# Patient Record
Sex: FEMALE | Race: WHITE | NOT HISPANIC OR LATINO | RURAL
[De-identification: names, ages, dates, MRNs, and addresses within clinical notes are randomized per-mention and may not be internally consistent; named-entity substitution may affect disease eponyms.]

---

## 2021-01-22 ENCOUNTER — HISTORICAL (OUTPATIENT)
Dept: ADMINISTRATIVE | Facility: HOSPITAL | Age: 36
End: 2021-01-22

## 2021-01-22 LAB — SARS-COV+SARS-COV-2 AG RESP QL IA.RAPID: NEGATIVE

## 2022-09-19 ENCOUNTER — HOSPITAL ENCOUNTER (EMERGENCY)
Facility: HOSPITAL | Age: 37
Discharge: HOME | End: 2022-09-19
Payer: COMMERCIAL

## 2022-09-19 VITALS
HEIGHT: 63 IN | TEMPERATURE: 99 F | WEIGHT: 140 LBS | HEART RATE: 82 BPM | BODY MASS INDEX: 24.8 KG/M2 | DIASTOLIC BLOOD PRESSURE: 74 MMHG | RESPIRATION RATE: 18 BRPM | OXYGEN SATURATION: 98 % | SYSTOLIC BLOOD PRESSURE: 116 MMHG

## 2022-09-19 DIAGNOSIS — S93.602A FOOT SPRAIN, LEFT, INITIAL ENCOUNTER: Primary | ICD-10-CM

## 2022-09-19 DIAGNOSIS — S99.922A INJURY OF LEFT FOOT: ICD-10-CM

## 2022-09-19 PROCEDURE — 99284 EMERGENCY DEPT VISIT MOD MDM: CPT

## 2022-09-19 PROCEDURE — 96372 THER/PROPH/DIAG INJ SC/IM: CPT

## 2022-09-19 PROCEDURE — 63600175 PHARM REV CODE 636 W HCPCS: Performed by: NURSE PRACTITIONER

## 2022-09-19 PROCEDURE — 99284 EMERGENCY DEPT VISIT MOD MDM: CPT | Mod: ,,, | Performed by: NURSE PRACTITIONER

## 2022-09-19 PROCEDURE — 99284 PR EMERGENCY DEPT VISIT,LEVEL IV: ICD-10-PCS | Mod: ,,, | Performed by: NURSE PRACTITIONER

## 2022-09-19 RX ORDER — KETOROLAC TROMETHAMINE 30 MG/ML
30 INJECTION, SOLUTION INTRAMUSCULAR; INTRAVENOUS
Status: COMPLETED | OUTPATIENT
Start: 2022-09-19 | End: 2022-09-19

## 2022-09-19 RX ADMIN — KETOROLAC TROMETHAMINE 30 MG: 30 INJECTION, SOLUTION INTRAMUSCULAR at 01:09

## 2022-09-19 NOTE — DISCHARGE INSTRUCTIONS
Wear ace wrap for support. Apply ice pack to area of pain 4-6 times per day. Take Tylenol or ibuprofen as needed for pain. Use crutches for at least 3 days to keep weight off of left foot. If still having pain after 3-5 days, follow-up with your PCP for recheck.

## 2022-09-19 NOTE — Clinical Note
"Tom Durbinie" Jovana was seen and treated in our emergency department on 9/19/2022.  She may return to work on 09/22/2022.       If you have any questions or concerns, please don't hesitate to call.      More Del Rosario NP"

## 2022-09-19 NOTE — ED PROVIDER NOTES
Encounter Date: 9/19/2022       History     Chief Complaint   Patient presents with    Foot Injury     Fell while running after hearing a pop to left foot     Presented with c/o pain in left foot after turning in over while running tonight around 1900.  fell to the ground due to the pain.  pain continues and has worsened especially with weight bearing.  has not taken any OTC pain med because she did not want to get up and get it. Has not applied ice pack since injury.  has had ankle fracture on left in the past but denies ankle pain now.    Review of patient's allergies indicates:   Allergen Reactions    Ceclor [cefaclor] Hives     Past Medical History:   Diagnosis Date    Asthma      Past Surgical History:   Procedure Laterality Date    TONSILLECTOMY      TUBAL LIGATION      TYMPANOSTOMY TUBE PLACEMENT       History reviewed. No pertinent family history.  Social History     Tobacco Use    Smoking status: Former     Types: Cigarettes, Vaping with nicotine    Smokeless tobacco: Never    Tobacco comments:     Vape with nicotene   Substance Use Topics    Alcohol use: Not Currently    Drug use: Not Currently     Review of Systems   Musculoskeletal:  Positive for arthralgias (left foot pain).   All other systems reviewed and are negative.    Physical Exam     Initial Vitals [09/19/22 0114]   BP Pulse Resp Temp SpO2   (!) 131/91 100 20 98.5 °F (36.9 °C) 100 %      MAP       --         Physical Exam    Vitals reviewed.  Constitutional: She appears well-developed and well-nourished. No distress.   HENT:   Head: Normocephalic.   Right Ear: External ear normal.   Left Ear: External ear normal.   Nose: Nose normal.   Mouth/Throat: Oropharynx is clear and moist.   Eyes: EOM are normal.   Neck: Neck supple.   Normal range of motion.  Cardiovascular:  Normal rate, regular rhythm, normal heart sounds and intact distal pulses.           Pulmonary/Chest: Breath sounds normal.   Abdominal: Abdomen is soft.  Bowel sounds are normal.   Musculoskeletal:         General: Tenderness (localized left medial foot) and edema (left foot dorsal aspect) present. Normal range of motion.      Cervical back: Normal range of motion and neck supple.     Neurological: She is alert and oriented to person, place, and time. She has normal strength. No cranial nerve deficit. GCS score is 15. GCS eye subscore is 4. GCS verbal subscore is 5. GCS motor subscore is 6.   Skin: Skin is warm and dry. Capillary refill takes less than 2 seconds.   Psychiatric: She has a normal mood and affect. Her behavior is normal. Judgment and thought content normal.       Medical Screening Exam   See Full Note    ED Course   Procedures  Labs Reviewed - No data to display       Imaging Results              X-Ray Foot Complete Left (In process)                   X-Rays:   Independently Interpreted Readings:   Other Readings:  Left foot: No acute fracture.  Medications   ketorolac injection 30 mg (30 mg Intramuscular Given 9/19/22 0138)     Medical Decision Making:   ED Management:  Xray left foot negative. Toradol IM given. Ace wrap applied. Crutches provided for patient and instructed on use per ED nurse. Instructed on RICE, no wt bearing for 3 days and follow-up for recheck if pain persists.                 Clinical Impression:   Final diagnoses:  [S99.922A] Injury of left foot  [S93.602A] Foot sprain, left, initial encounter (Primary)        ED Disposition Condition    Discharge Stable          ED Prescriptions    None       Follow-up Information       Follow up With Specialties Details Why Contact Info    [facility]  If needed for persistent pain [address] [facility]  [facility] [place] [address]  [phone]               More Del Rosario NP  09/19/22 0325       More Del Rosario NP  09/19/22 032

## 2022-09-19 NOTE — ED TRIAGE NOTES
Patient was running on a track at 1900 when she heard a pop to her left foot and fell, now has some swelling and pain, has not taken anything for the pain, has propped it up

## 2022-09-21 ENCOUNTER — OFFICE VISIT (OUTPATIENT)
Dept: FAMILY MEDICINE | Facility: CLINIC | Age: 37
End: 2022-09-21
Payer: COMMERCIAL

## 2022-09-21 VITALS
DIASTOLIC BLOOD PRESSURE: 78 MMHG | HEART RATE: 93 BPM | SYSTOLIC BLOOD PRESSURE: 114 MMHG | WEIGHT: 140 LBS | BODY MASS INDEX: 24.8 KG/M2 | HEIGHT: 63 IN

## 2022-09-21 DIAGNOSIS — M79.672 LEFT FOOT PAIN: ICD-10-CM

## 2022-09-21 DIAGNOSIS — M79.672 PAIN IN LEFT FOOT: Primary | ICD-10-CM

## 2022-09-21 PROCEDURE — 1159F PR MEDICATION LIST DOCUMENTED IN MEDICAL RECORD: ICD-10-PCS | Mod: CPTII,,, | Performed by: FAMILY MEDICINE

## 2022-09-21 PROCEDURE — 99203 PR OFFICE/OUTPT VISIT, NEW, LEVL III, 30-44 MIN: ICD-10-PCS | Mod: 25,,, | Performed by: FAMILY MEDICINE

## 2022-09-21 PROCEDURE — 3078F PR MOST RECENT DIASTOLIC BLOOD PRESSURE < 80 MM HG: ICD-10-PCS | Mod: CPTII,,, | Performed by: FAMILY MEDICINE

## 2022-09-21 PROCEDURE — 3008F BODY MASS INDEX DOCD: CPT | Mod: CPTII,,, | Performed by: FAMILY MEDICINE

## 2022-09-21 PROCEDURE — 96372 THER/PROPH/DIAG INJ SC/IM: CPT | Mod: ,,, | Performed by: FAMILY MEDICINE

## 2022-09-21 PROCEDURE — 1159F MED LIST DOCD IN RCRD: CPT | Mod: CPTII,,, | Performed by: FAMILY MEDICINE

## 2022-09-21 PROCEDURE — 3074F SYST BP LT 130 MM HG: CPT | Mod: CPTII,,, | Performed by: FAMILY MEDICINE

## 2022-09-21 PROCEDURE — 99203 OFFICE O/P NEW LOW 30 MIN: CPT | Mod: 25,,, | Performed by: FAMILY MEDICINE

## 2022-09-21 PROCEDURE — 3008F PR BODY MASS INDEX (BMI) DOCUMENTED: ICD-10-PCS | Mod: CPTII,,, | Performed by: FAMILY MEDICINE

## 2022-09-21 PROCEDURE — 3074F PR MOST RECENT SYSTOLIC BLOOD PRESSURE < 130 MM HG: ICD-10-PCS | Mod: CPTII,,, | Performed by: FAMILY MEDICINE

## 2022-09-21 PROCEDURE — 3078F DIAST BP <80 MM HG: CPT | Mod: CPTII,,, | Performed by: FAMILY MEDICINE

## 2022-09-21 PROCEDURE — 96372 PR INJECTION,THERAP/PROPH/DIAG2ST, IM OR SUBCUT: ICD-10-PCS | Mod: ,,, | Performed by: FAMILY MEDICINE

## 2022-09-21 RX ORDER — KETOROLAC TROMETHAMINE 10 MG/1
10 TABLET, FILM COATED ORAL EVERY 6 HOURS
Qty: 20 TABLET | Refills: 0 | Status: SHIPPED | OUTPATIENT
Start: 2022-09-21 | End: 2022-09-26

## 2022-09-21 RX ORDER — KETOROLAC TROMETHAMINE 30 MG/ML
60 INJECTION, SOLUTION INTRAMUSCULAR; INTRAVENOUS
Status: COMPLETED | OUTPATIENT
Start: 2022-09-21 | End: 2022-09-21

## 2022-09-21 RX ADMIN — KETOROLAC TROMETHAMINE 60 MG: 30 INJECTION, SOLUTION INTRAMUSCULAR; INTRAVENOUS at 02:09

## 2022-09-21 NOTE — LETTER
September 21, 2022      [facility]  [address] [place] [address]  Phone: [phone]  Fax: [phone]       Patient: Tom Whaley   YOB: 1985  Date of Visit: 09/21/2022    To Whom It May Concern:    Nancy Whaley  was at [facility] on 09/21/2022. The patient may return to work/school on 09/26/2022 with no restrictions. If you have any questions or concerns, or if I can be of further assistance, please do not hesitate to contact me.    Sincerely,    Mary Kay Segura RN

## 2022-09-21 NOTE — LETTER
September 26, 2022      [facility]  [address] [place] [address]  Phone: [phone]  Fax: [phone]       Patient: Tom Whaley   YOB: 1985  Date of Visit: 09/26/2022    To Whom It May Concern:    Nancy Whaley  was at [facility] on 09/21/2022. The patient may return to work/school on 10/03/2022 with no restrictions. If you have any questions or concerns, or if I can be of further assistance, please do not hesitate to contact me.    Sincerely,    Vinicio Sr, DO

## 2022-09-21 NOTE — PROGRESS NOTES
Vinicio Sr IV, DO  [facility]  [address] [place] [address]  Phone: [phone]      Subjective     Name: Tom Whaley   Sex: female  YOB: 1985 (37 y.o.)  MRN: [id number]  Visit Date: 09/21/2022   Chief Complaint: Foot Pain (Injured foot suday evening)        HISTORY OF PRESENT ILLNESS:    Patient presents clinic for follow-up.  She rolled her foot this past weekend at the track.  And attended the emergency department afterwards.  They did an x-ray which showed no acute fractures or other pathological processes.  Today in the clinic, there is notable bruising between the 2nd 3rd and 4th toes.  There is a slight erythema on the dorsum of the foot as well as acute tenderness to palpation in the area of the extensor digitorum brevis.  Patient is unable to extend her 1st 2nd or 3rd digits and is easy to flex them with moderate pain.  My concern is partial tear of the extensor digitorum brevis and I suggested MRI of the foot to verify.  This will likely take prior authorization of like for patient having been seen as possible and I will call patient with results when they come in.  In the clinic today we will control her pain with a an IM shot of ketorolac 60 mg and I will send her home on 10 mg ketorolac to be taken up to 4 times a day p.r.n. pain for no longer than 5 days.  Medication side effects adverse effects were explained patient patient was told that if the pain medicine does not work to stop taking it and call office.  Otherwise patient should follow p.r.n..      PAST MEDICAL HISTORY:  Significant Diagnoses - Patient  has a past medical history of Asthma.  Medications - Patient is not on any long-term medications.   Allergies - Patient is allergic to ceclor [cefaclor].  Surgeries - Patient  has a past surgical history that includes Tubal ligation; Tonsillectomy; and Tympanostomy tube placement.  Family History - Patient family history is  "not on file.      SOCIAL HISTORY:  Tobacco - Patient  reports that she has quit smoking. Her smoking use included cigarettes and vaping with nicotine. She has never used smokeless tobacco.   Alcohol - Patient  reports that she does not currently use alcohol.   Recreational Drugs - Patient  reports that she does not currently use drugs.       Review of Systems   Constitutional:  Negative for fatigue and fever.   HENT:  Negative for nasal congestion and sore throat.    Respiratory:  Negative for cough and shortness of breath.    Cardiovascular:  Negative for chest pain.   Gastrointestinal:  Negative for abdominal pain, nausea and vomiting.   Genitourinary:  Negative for dysuria.   Musculoskeletal:  Positive for gait problem. Negative for myalgias.        Foot pain.   Integumentary:  Positive for color change. Negative for rash.   Neurological:  Negative for dizziness, weakness and headaches.        Past Medical History:   Diagnosis Date    Asthma         Review of patient's allergies indicates:   Allergen Reactions    Ceclor [cefaclor] Hives        Past Surgical History:   Procedure Laterality Date    TONSILLECTOMY      TUBAL LIGATION      TYMPANOSTOMY TUBE PLACEMENT          No family history on file.    Current Outpatient Medications   Medication Instructions    ketorolac (TORADOL) 10 mg, Oral, Every 6 hours        Objective     /78   Pulse 93   Ht 5' 3" (1.6 m)   Wt 63.5 kg (140 lb)   LMP 09/12/2022 (Approximate)   BMI 24.80 kg/m²     Physical Exam  Constitutional:       General: She is not in acute distress.     Appearance: Normal appearance. She is not ill-appearing.   HENT:      Head: Normocephalic and atraumatic.      Right Ear: External ear normal.      Left Ear: External ear normal.   Eyes:      Extraocular Movements: Extraocular movements intact.      Conjunctiva/sclera: Conjunctivae normal.   Cardiovascular:      Rate and Rhythm: Normal rate.      Heart sounds: No murmur heard.  Pulmonary: "      Effort: Pulmonary effort is normal.   Musculoskeletal:      Cervical back: Normal range of motion.        Feet:    Skin:     General: Skin is warm and dry.      Coloration: Skin is not jaundiced.      Findings: No rash.   Neurological:      Mental Status: She is alert.   Psychiatric:         Mood and Affect: Mood normal.        All recently obtained labs have been reviewed and discussed in detail with the patient.   Assessment     1. Pain in left foot         Plan        Problem List Items Addressed This Visit    None  Visit Diagnoses       Pain in left foot    -  Primary    Relevant Medications    ketorolac (TORADOL) 10 mg tablet    ketorolac injection 60 mg    Other Relevant Orders    MRI Foot (Forefoot) Left Without Contrast            No follow-ups on file.    Vinicio Sr DO  [facility]

## 2022-09-22 NOTE — ADDENDUM NOTE
Encounter addended by: Summer Wong on: 9/22/2022 3:55 PM   Actions taken: SmartForm saved, Flowsheet accepted